# Patient Record
Sex: FEMALE | Race: BLACK OR AFRICAN AMERICAN | Employment: OTHER | URBAN - METROPOLITAN AREA
[De-identification: names, ages, dates, MRNs, and addresses within clinical notes are randomized per-mention and may not be internally consistent; named-entity substitution may affect disease eponyms.]

---

## 2017-12-20 ENCOUNTER — OFFICE VISIT (OUTPATIENT)
Dept: ENDOCRINOLOGY | Age: 82
End: 2017-12-20

## 2017-12-20 VITALS
BODY MASS INDEX: 23.45 KG/M2 | HEART RATE: 74 BPM | HEIGHT: 67 IN | WEIGHT: 149.4 LBS | SYSTOLIC BLOOD PRESSURE: 137 MMHG | DIASTOLIC BLOOD PRESSURE: 72 MMHG

## 2017-12-20 DIAGNOSIS — E04.2 MULTINODULAR GOITER: Primary | ICD-10-CM

## 2017-12-20 RX ORDER — SPIRONOLACTONE 50 MG/1
TABLET, FILM COATED ORAL
COMMUNITY
Start: 2017-09-22

## 2017-12-20 RX ORDER — MELATONIN
DAILY
COMMUNITY

## 2017-12-20 RX ORDER — LATANOPROST 50 UG/ML
SOLUTION/ DROPS OPHTHALMIC
COMMUNITY
Start: 2017-10-31

## 2017-12-20 RX ORDER — CITALOPRAM 20 MG/1
TABLET, FILM COATED ORAL
COMMUNITY
Start: 2017-12-13

## 2017-12-20 NOTE — PATIENT INSTRUCTIONS
Plan for a thyroid biopsy of the large 4 cm nodule in each lobe of your thyroid gland. 2 nodules total.   You will receive a call to schedule this. Plan to repeat your ultrasound in 6 months, 1 week before you come back to see me. Call with any concerns,    Randee President.  [address] [facility] End[facility]  [facility]

## 2017-12-20 NOTE — PROGRESS NOTES
CONSULTATION REQUESTED BY: Brenda Dodson MD     REASON FOR CONSULT: Evaluation of multinodular goiter    CHIEF COMPLAINT: Thyroid nodule    HISTORY OF PRESENT ILLNESS:   Jessica Wilson is a 80 y.o. female with a PMHx as noted below who was referred to our endocrinology clinic for evaluation of multinodular goiter. Patient describes that Dr. Carrington Gallegos noticed nodules on examination. She was sent for an ultrasound at the [facility], working to obtain outside report. Family history is not significant for thyroid nodules or thyroid cancers. Patient denies any history of radiation exposure. They deny any dysphagia or dyspnea. Patient denies symptoms of hyper or hypothyroidism. Recent thyroid ultrasound: [facility],  Date: 9/15/17:  Findings:  Right lobe 5.4 cm   Left lobe 5.6 cm  \"Multiple bilateral cystic and solid thyroid nodules are seen\". Right-Mid dominant 4.4 x 2.5 x 3.8 cm \"elipitical\" solid nodule. Left-Posterior dominant 4.0 x 2.5 x 4.0 cm \"eliptical\" solid nodule. Review of most recent thyroid function:  Outside records reviewed 12/13/17:   TSH  2.47  FT4 1.03    At this time they would like to further investigate the nature of the thyroid nodule. PAST MEDICAL/SURGICAL HISTORY:   HTN    ALLERGIES:   No Known Allergies    MEDICATIONS ON ADMISSION:     Current Outpatient Prescriptions:     spironolactone (ALDACTONE) 50 mg tablet, , Disp: , Rfl:     citalopram (CELEXA) 20 mg tablet, , Disp: , Rfl:     latanoprost (XALATAN) 0.005 % ophthalmic solution, , Disp: , Rfl:     cholecalciferol (VITAMIN D3) 1,000 unit tablet, Take  by mouth daily. , Disp: , Rfl:     SOCIAL HISTORY:   Social History     Social History    Marital status: UNKNOWN     Spouse name: N/A    Number of children: N/A    Years of education: N/A     Occupational History    Not on file.      Social History Main Topics    Smoking status: Never Smoker    Smokeless tobacco: Never Used    Alcohol use Not on file    Drug use: Not on file    Sexual activity: Not on file     Other Topics Concern    Not on file     Social History Narrative    No narrative on file       FAMILY HISTORY:  Family History   Problem Relation Age of Onset    Bleeding Prob Mother     Hypertension Mother     Alcohol abuse Brother     Arthritis-osteo Neg Hx     Asthma Neg Hx     Cancer Neg Hx     Diabetes Neg Hx     Elevated Lipids Neg Hx     Gall Bladder Disease Neg Hx     Headache Neg Hx     Heart Disease Neg Hx     Lung Disease Neg Hx     Mental Retardation Neg Hx     Migraines Neg Hx     Psychiatric Disorder Neg Hx     Stroke Neg Hx        REVIEW OF SYSTEMS: Complete ROS assessed and noted for that which is described above, all else are negative. Eyes: normal  ENT: normal  CVS: normal  Resp: normal  GI: normal  : normal  GYN: normal  Endocrine: normal  Integument: normal  Musculoskeletal: normal  Neuro: normal  Psych: normal    PHYSICAL EXAMINATION:    VITAL SIGNS:  Visit Vitals    /72 (BP 1 Location: Left arm, BP Patient Position: Sitting)    Pulse 74    Ht 5' 7\" (1.702 m)    Wt 149 lb 6.4 oz (67.8 kg)    BMI 23.4 kg/m2       GENERAL: NCAT, Sitting comfortably, NAD  EYES: EOMI, non-icteric, no proptosis  Ear/Nose/Throat: NCAT, no inflammation, thyroid gland is bulky b/l  LYMPH NODES: No LAD  CARDIOVASCULAR: S1 S2, RRR, No murmur, 2+ radial pulses  RESPIRATORY: CTA b/l, no wheeze/rales  GASTROINTESTINAL: NT, ND  MUSCULOSKELETAL: Normal ROM, no atrophy  SKIN: warm, no edema/rash/ or other skin changes  NEUROLOGIC: 5/5 power all extremities, AAOx3, no tremors  PSYCHIATRIC: Normal affect, Normal insight and judgement      REVIEW OF LABORATORY AND RADIOLOGY DATA:   Labs and documentation have been reviewed as described above.      ASSESSMENT AND PLAN:   Hayden Barbosa is a 80 y.o. female with a PMHx as noted above who was referred to our endocrinology clinic for evaluation of multinodular goiter. Thyroid Nodule    Today we discussed the incidence of thyroid nodules in the community and the natural history of most thyroid nodules. We also discussed the proper workup and evaluation for thyroid nodules and management based upon the clinical features and sonographic patterns which may suggest benign vs more concerning nodules. We discussed the role of evaluating thyroid function to assess the impact of thyroid hormone levels on nodular size and to evaluate for autonomous toxic nodules, as this would also affect management decisions. Ultimately we also discussed the role of thyroid biopsies should that become indicated. Today based on our discussion, we have decided that because these bulky nodules have only recently become noticeable and thus the rate of growth is in question, along with the fact that we have no ultrasound characterization on the thyroid ultrasound report other than the size, it might be best to proceed with biopsy. Patient has been anxious about the nodules since the diagnosis and she has taken anxiety medication due to this. I believe it would be good to get the reassurance in her case. FNA biopsy of 4.4 Right-Mid dominant nodule,  FNA biopsy of 4.0 Left-Posterior dominant nodule,  Afirma if there are indeterminate nodules,  US ordered for 1 week before her next f/u in 6 months,  Will discuss results by phone when it becomes available,    6 month follow up, however can adjust as needed if biopsy does not suggest a benign result. Imani Oshea.  [address] [facility]

## 2017-12-20 NOTE — MR AVS SNAPSHOT
Visit Information Date & Time Provider Department Dept. Phone Encounter #  
 12/20/2017  1:30 PM Hardik Nayak, [address] [place] [address] Diabetes and Endocrinology [phone] Follow-up Instructions Return in about 6 months (around 6/20/2018). Upcoming Health Maintenance Date Due DTaP/Tdap/Td series (1 - Tdap) 8/13/1954 ZOSTER VACCINE AGE 60> 6/13/1993 GLAUCOMA SCREENING Q2Y 8/13/1998 OSTEOPOROSIS SCREENING (DEXA) 8/13/1998 Pneumococcal 65+ Low/Medium Risk (1 of 2 - PCV13) 8/13/1998 MEDICARE YEARLY EXAM 8/13/1998 Influenza Age 5 to Adult 8/1/2017 Allergies as of 12/20/2017  Review Complete On: 12/20/2017 By: Hardik Nayak MD  
 No Known Allergies Current Immunizations  Never Reviewed No immunizations on file. Not reviewed this visit You Were Diagnosed With   
  
 Codes Comments Multinodular goiter    -  Primary ICD-10-CM: E04.2 ICD-9-CM: 665. 1 Vitals BP Pulse Height(growth percentile) Weight(growth percentile) BMI Smoking Status 137/72 (BP 1 Location: Left arm, BP Patient Position: Sitting) 74 5' 7\" (1.702 m) 149 lb 6.4 oz (67.8 kg) 23.4 kg/m2 Never Smoker BMI and BSA Data Body Mass Index Body Surface Area  
 23.4 kg/m 2 1.79 m 2 Preferred Pharmacy Pharmacy Name Phone [facility] [address] [place] [phone] Your Updated Medication List  
  
   
This list is accurate as of: 12/20/17  2:12 PM.  Always use your most recent med list.  
  
  
  
  
 citalopram 20 mg tablet Commonly known as:  CELEXA  
  
 latanoprost 0.005 % ophthalmic solution Commonly known as:  XALATAN  
  
 spironolactone 50 mg tablet Commonly known as:  ALDACTONE  
  
 VITAMIN D3 1,000 unit tablet Generic drug:  cholecalciferol Take  by mouth daily. Follow-up Instructions Return in about 6 months (around 6/20/2018). To-Do List   
 12/20/2017 Imaging:  US GUIDE FINE NDL ASP W IMAGE   
  
 06/11/2018 Imaging:  US THYROID/PARATHYROID/SOFT TISS Patient Instructions Plan for a thyroid biopsy of the large 4 cm nodule in each lobe of your thyroid gland. 2 nodules total.  
You will receive a call to schedule this. Plan to repeat your ultrasound in 6 months, 1 week before you come back to see me. Call with any concerns, Miguel Leigh. [address] [facility] & Endocrinology [address] [place] [address] Introducing [facility]! Rishabh Gomez introduces Thinkgl[employer] patient portal. Now you can access parts of your medical record, email your doctor's office, and request medication refills online. 1. In your internet browser, go to [email or web address] 2. Click on the First Time User? Click Here link in the Sign In box. You will see the New Member Sign Up page. 3. Enter your Thinkglue Access Code exactly as it appears below. You will not need to use this code after youve completed the sign-up process. If you do not sign up before the expiration date, you must request a new code. · Think[employer]ue Access Code: [email or web address] Expires: 3/20/2018  2:10 PM 
 
4. Enter the last four digits of your Social Security Number (xxxx) and Date of Birth (mm/dd/yyyy) as indicated and click Submit. You will be taken to the next sign-up page. 5. Create a Thinkglue ID. This will be your Thinkglue login ID and cannot be changed, so think of one that is secure and easy to remember. 6. Create a Thinkglue password. You can change your password at any time. 7. Enter your Password Reset Question and Answer. This can be used at a later time if you forget your password. 8. Enter your e-mail address. You will receive e-mail notification when new information is available in [address] 9. Click Sign Up. You can now view and download portions of your medical record.  
10. Click the Download Summary menu link to download a portable copy of your medical information. If you have questions, please visit the Frequently Asked Questions section of the [employer] website. Remember, [employer]OrPass is NOT to be used for urgent needs. For medical emergencies, dial 911. Now available from your iPhone and Android! Please provide this summary of care documentation to your next provider. Your primary care clinician is listed as Singh Ovalles. If you have any questions after today's visit, please call [phone].

## 2018-01-15 ENCOUNTER — HOSPITAL ENCOUNTER (OUTPATIENT)
Dept: ULTRASOUND IMAGING | Age: 83
Discharge: HOME | End: 2018-01-15
Attending: INTERNAL MEDICINE
Payer: MEDICARE

## 2018-01-15 DIAGNOSIS — E04.2 MULTINODULAR GOITER: ICD-10-CM

## 2018-01-15 PROCEDURE — 88173 CYTOPATH EVAL FNA REPORT: CPT | Performed by: INTERNAL MEDICINE

## 2018-01-15 PROCEDURE — 76942 ECHO GUIDE FOR BIOPSY: CPT

## 2018-01-15 PROCEDURE — 88172 CYTP DX EVAL FNA 1ST EA SITE: CPT | Performed by: INTERNAL MEDICINE

## 2018-01-15 PROCEDURE — 74011000250 HC RX REV CODE- 250: Performed by: RADIOLOGY

## 2018-01-15 RX ORDER — LIDOCAINE HYDROCHLORIDE 10 MG/ML
10 INJECTION, SOLUTION EPIDURAL; INFILTRATION; INTRACAUDAL; PERINEURAL
Status: COMPLETED | OUTPATIENT
Start: 2018-01-15 | End: 2018-01-15

## 2018-01-15 RX ADMIN — LIDOCAINE HYDROCHLORIDE 10 ML: 10 INJECTION, SOLUTION EPIDURAL; INFILTRATION; INTRACAUDAL; PERINEURAL at 11:15

## 2018-01-15 NOTE — DISCHARGE INSTRUCTIONS
[facility]  Department of Radiology  [phone] Ultrasound Department  [phone] Emergency Department    BIOPSY DISCHARGE INSTRUCTIONS for Bhavani Duncan on Monday January 15, 2018  General Instructions:  - A biopsy is the removal of a small piece of tissue for microscopic examination or testing.  - Healthy tissue can be obtained for the purpose of tissue-type matching for transplants. - Unhealthy tissues are more commonly biopsied to diagnose disease. General Biopsy:  - A mass can grow in any area of the body, and we are taking a specimen as ordered by your doctor. - The risks are the same. They include bleeding, pain, and infection. Home Care Instructions:  - You may resume your regular diet and medication regimen. - You may use over the counter acetaminophen (Tylenol) or ibuprofen (Advil) for the soreness. - You may apply an ice pack to the affected area for 20-30 minutes at time for the first 24 hours. -- After that, you may apply a heat pack. - You may remove the bandaid(s) tonight. - You may take a shower tonight. - Please keep the site clean and dry for 24-48 hours. - Do not soak in any kind of water (bath tub, hot tub, pool, ocean, etc) for 24-48 hours. - Do not participate in any kind of activity making you vigorously sweat for 24-48 hours. - Do not use any moisturizer or makeup on the site for 24-48 hours. Call If:  - You should call your doctor if you have any questions or concerns about the biopsy site. - Call if you should have increased pain, fever, redness, drainage, or bleeding more than a small spot on the bandage. Follow-Up Instructions:  - Please see your doctor as he has requested.

## 2018-01-19 NOTE — PROGRESS NOTES
Biopsy of thyroid:   Right nodule benign,  Left nodule non-diagnostic  Discussed options with patient by phone,  She prefers to check the 6 month f/u ultrasound,  At that time can determine if to proceed with re-biopsy of the left nodule,  I believe that is a very reasonable approach,    Ciara Roth.  [address] [facility]  [place]